# Patient Record
(demographics unavailable — no encounter records)

---

## 2025-07-08 NOTE — DISCUSSION/SUMMARY
[Normal Growth] : growth [Normal Development] : development  [No Elimination Concerns] : elimination [Continue Regimen] : feeding [Recent Change In Sleep] : recent change in sleep [Anticipatory Guidance Given] : Anticipatory guidance addressed as per the history of present illness section [Physical Growth and Development] : physical growth and development [Social and Academic Competence] : social and academic competence [Emotional Well-Being] : emotional well-being [Risk Reduction] : risk reduction [Violence and Injury Prevention] : violence and injury prevention [HPV] : human papilloma [Patient] : patient [Mother] : mother [Full Activity without restrictions including Physical Education & Athletics] : Full Activity without restrictions including Physical Education & Athletics [I have examined the above-named student and completed the preparticipation physical evaluation. The athlete does not present apparent clinical contraindications to practice and participate in sport(s) as outlined above. A copy of the physical exam is on r] : I have examined the above-named student and completed the preparticipation physical evaluation. The athlete does not present apparent clinical contraindications to practice and participate in sport(s) as outlined above. A copy of the physical exam is on record in my office and can be made available to the school at the request of the parents. If conditions arise after the athlete has been cleared for participation, the physician may rescind the clearance until the problem is resolved and the potential consequences are completely explained to the athlete (and parents/guardians). [FreeTextEntry1] : --- 12 year old female here for well adolescent check with age-appropriate growth, nutrition, and development. Slower weight gain than expected. Passed hearing screen ([facility]). Abnormal vision screen, worse than last year (Snellen). Abnormal depression screen (PHQ-9 score: 20), No SI/HI/no active plan. Possible Obsessive-Compulsive Disorder. Eligible for HPV #1 today.  - Referred to psychiatry for further evaluation/management. Referred to ophthalmology and dermatology for further evaluation/management. - Discussed today's immunizations: HPV #1. Parental consent obtained. - Provided age-appropriate anticipatory guidance. - Follow up height/weight velocity and mental health status in 3 months. - Return for 13 year old well check, or sooner if concerns arise.

## 2025-07-08 NOTE — PHYSICAL EXAM
[Alert] : alert [No Acute Distress] : no acute distress [Normocephalic] : normocephalic [EOMI Bilateral] : EOMI bilateral [PERRLA] : AUSTEN [Conjunctivae with no discharge] : conjunctivae with no discharge [Pink Nasal Mucosa] : pink nasal mucosa [Nonerythematous Oropharynx] : nonerythematous oropharynx [No Caries] : no caries [Supple, full passive range of motion] : supple, full passive range of motion [No Palpable Masses] : no palpable masses [Clear to Auscultation Bilaterally] : clear to auscultation bilaterally [Regular Rate and Rhythm] : regular rate and rhythm [Normal S1, S2 audible] : normal S1, S2 audible [No Murmurs] : no murmurs [Soft] : soft [NonTender] : non tender [Non Distended] : non distended [No Abnormal Lymph Nodes Palpated] : no abnormal lymph nodes palpated [Normal Muscle Tone] : normal muscle tone [No Gait Asymmetry] : no gait asymmetry [Moves all extremities x 4] : moves all extremities x4 [Straight] : straight [No Scoliosis] : no scoliosis [Cranial Nerves Grossly Intact] : cranial nerves grossly intact [FreeTextEntry5] : squinting [de-identified] : acne

## 2025-07-08 NOTE — RISK ASSESSMENT
[1/2 of Days or More (2)] : 5.) Poor appetite or overeating? Half the days or more [Nearly Every Day (3)] : 7.) Trouble concentrating on things, such as reading a newspaper or watching television? Nearly every day [Several Days (1)] : 9.) Thoughts that you would be off dead or of hurting yourself in some way? Several days [Severe] : Severity of Depression is Severe [Somewhat Difficult] : How difficult have these problems made it for you to do your work, take care of things at home, or get along with people? Somewhat difficult [PHQ-9 Positive] : PHQ-9 Positive [I have developed a follow-up plan documented below in the note.] : I have developed a follow-up plan documented below in the note. [[id number]] : 20

## 2025-07-08 NOTE — HISTORY OF PRESENT ILLNESS
[Mother] : mother [Yes] : Patient goes to dentist yearly [Toothpaste] : Primary Fluoride Source: Toothpaste [LMP: _____] : LMP: [unfilled] [Age of Menarche: ____] : Age of Menarche: [unfilled] [Grade: ____] : Grade: [unfilled] [Normal Performance] : normal performance [Normal Behavior/Attention] : normal behavior/attention [Normal Homework] : normal homework [Eats regular meals including adequate fruits and vegetables] : eats regular meals including adequate fruits and vegetables [Drinks non-sweetened liquids] : drinks non-sweetened liquids  [Calcium source] : calcium source [At least 1 hour of physical activity a day] : at least 1 hour of physical activity a day [Uses safety belts/safety equipment] : uses safety belts/safety equipment  [Has peer relationships free of violence] : has peer relationships free of violence [No] : Patient has not had sexual intercourse [With Teen] : teen [With Parent/Guardian] : parent/guardian [Needs Immunizations] : Needs immunizations [Eats meals with family] : eats meals with family [Is permitted and is able to make independent decisions] : Is permitted and is able to make independent decisions [Sleep Concerns] : sleep concerns [Has friends] : has friends [Displays self-confidence] : displays self-confidence [Has problems with sleep] : has problems with sleep [Gets depressed, anxious, or irritable/has mood swings] : gets depressed, anxious, or irritable/has mood swings [Irregular menses] : no irregular menses [Heavy Bleeding] : no heavy bleeding [Painful Cramps] : no painful cramps [Has family members/adults to turn to for help] : does not has family members/adults to turn to for help [Screen time (except homework) less than 2 hours a day] : no screen time (except homework) less than 2 hours a day [Uses electronic nicotine delivery system] : does not use electronic nicotine delivery system [Uses tobacco] : does not use tobacco [Uses drugs] : does not use drugs  [Drinks alcohol] : does not drink alcohol [Impaired/distracted driving] : no impaired/distracted driving [Has ways to cope with stress] : does not have ways to cope with stress [FreeTextEntry7] : none [de-identified] : none [de-identified] : needs to get a root canal [de-identified] : sleeps 8-9 hours [FreeTextEntry3] : denies [FreeTextEntry4] : denies [FreeTextEntry5] : referred today [FreeTextEntry1] : --- OCD tendencies: needs to touch paper corners in certain order. needs to walk over manholes with certain foot in a certain way